# Patient Record
Sex: MALE | Race: ASIAN | NOT HISPANIC OR LATINO | Employment: OTHER | URBAN - METROPOLITAN AREA
[De-identification: names, ages, dates, MRNs, and addresses within clinical notes are randomized per-mention and may not be internally consistent; named-entity substitution may affect disease eponyms.]

---

## 2017-05-16 ENCOUNTER — OFFICE VISIT (OUTPATIENT)
Dept: URGENT CARE | Facility: CLINIC | Age: 77
End: 2017-05-16
Payer: MEDICARE

## 2017-05-16 VITALS
SYSTOLIC BLOOD PRESSURE: 140 MMHG | HEART RATE: 64 BPM | TEMPERATURE: 98 F | DIASTOLIC BLOOD PRESSURE: 100 MMHG | HEIGHT: 64 IN | RESPIRATION RATE: 16 BRPM | BODY MASS INDEX: 29.02 KG/M2 | WEIGHT: 170 LBS | OXYGEN SATURATION: 94 %

## 2017-05-16 DIAGNOSIS — K21.9 GASTROESOPHAGEAL REFLUX DISEASE WITHOUT ESOPHAGITIS: ICD-10-CM

## 2017-05-16 PROCEDURE — 1101F PT FALLS ASSESS-DOCD LE1/YR: CPT | Mod: 8P | Performed by: PHYSICIAN ASSISTANT

## 2017-05-16 PROCEDURE — 4004F PT TOBACCO SCREEN RCVD TLK: CPT | Mod: 8P | Performed by: PHYSICIAN ASSISTANT

## 2017-05-16 PROCEDURE — 99204 OFFICE O/P NEW MOD 45 MIN: CPT | Performed by: PHYSICIAN ASSISTANT

## 2017-05-16 PROCEDURE — G8419 CALC BMI OUT NRM PARAM NOF/U: HCPCS | Performed by: PHYSICIAN ASSISTANT

## 2017-05-16 PROCEDURE — G8432 DEP SCR NOT DOC, RNG: HCPCS | Performed by: PHYSICIAN ASSISTANT

## 2017-05-16 PROCEDURE — 4040F PNEUMOC VAC/ADMIN/RCVD: CPT | Mod: 8P | Performed by: PHYSICIAN ASSISTANT

## 2017-05-16 RX ORDER — PANTOPRAZOLE SODIUM 40 MG/1
40 TABLET, DELAYED RELEASE ORAL DAILY
Qty: 30 TAB | Refills: 0 | Status: SHIPPED | OUTPATIENT
Start: 2017-05-16

## 2017-05-16 RX ORDER — VALSARTAN 80 MG/1
80 TABLET ORAL DAILY
COMMUNITY

## 2017-05-16 RX ORDER — ATORVASTATIN CALCIUM 10 MG/1
20 TABLET, FILM COATED ORAL NIGHTLY
COMMUNITY

## 2017-05-16 RX ORDER — RANITIDINE 150 MG/1
150 TABLET ORAL 2 TIMES DAILY
Qty: 60 TAB | Refills: 0 | Status: SHIPPED | OUTPATIENT
Start: 2017-05-16

## 2017-05-16 RX ORDER — DOXAZOSIN MESYLATE 1 MG/1
1 TABLET ORAL DAILY
COMMUNITY

## 2017-05-16 ASSESSMENT — ENCOUNTER SYMPTOMS
CHILLS: 0
CHANGE IN BOWEL HABIT: 0
PALPITATIONS: 0
FOCAL WEAKNESS: 0
SHORTNESS OF BREATH: 0
FEVER: 0
TINGLING: 0
SENSORY CHANGE: 0
NAUSEA: 0
HEARTBURN: 1
VOMITING: 0
FATIGUE: 0
DIZZINESS: 0
ABDOMINAL PAIN: 1
MYALGIAS: 0
COUGH: 0
DIARRHEA: 0
CONSTIPATION: 0
BLOOD IN STOOL: 0

## 2017-05-16 NOTE — MR AVS SNAPSHOT
"        Red Dukesn   2017 5:15 PM   Office Visit   MRN: [id number]    Department:  [facility]   Dept Phone:  [phone]    Description:  Male : 1940   Provider:  Vanessa Craft PA-C           Reason for Visit     Heartburn on and off x1wk      Allergies as of 2017     Allergen Noted Reactions    Asa [Aspirin] 2017   Unspecified    Gi problem      You were diagnosed with     Gastroesophageal reflux disease without esophagitis   [803257]         Vital Signs     Blood Pressure Pulse Temperature Respirations Height Weight    140/100 mmHg 64 36.7 °C (98 °F) 16 1.613 m (5' 3.5\") 77.111 kg (170 lb)    Body Mass Index Oxygen Saturation                29.64 kg/m2 94%          Basic Information     Date Of Birth Sex Race Ethnicity Preferred Language    1940 Male  Non- English      Health Maintenance     Patient has no pending health maintenance at this time      Current Immunizations     No immunizations on file.      Below and/or attached are the medications your provider expects you to take. Review all of your home medications and newly ordered medications with your provider and/or pharmacist. Follow medication instructions as directed by your provider and/or pharmacist. Please keep your medication list with you and share with your provider. Update the information when medications are discontinued, doses are changed, or new medications (including over-the-counter products) are added; and carry medication information at all times in the event of emergency situations     Allergies:  ASA - Unspecified               Medications  Valid as of: May 16, 2017 -  6:21 PM    Generic Name Brand Name Tablet Size Instructions for use    Atorvastatin Calcium (Tab) LIPITOR 10 MG Take 20 mg by mouth every evening.        Clopidogrel Bisulfate   Take  by mouth.        Doxazosin Mesylate (Tab) CARDURA 1 MG Take 1 mg by mouth every day.        Dutasteride   Take  by mouth.       " Famotidine   Take  by mouth.        Pantoprazole Sodium (Tablet Delayed Response) PROTONIX 40 MG Take 1 Tab by mouth every day.        RaNITidine HCl (Tab) ZANTAC 150 MG Take 1 Tab by mouth 2 times a day.        Valsartan (Tab) DIOVAN 80 MG Take 80 mg by mouth every day.        .                 Medicines prescribed today were sent to:     [facility]ARMACY #[id number] - [place], [place] - [address]    [address] [place] [address]    Phone: [phone] Fax: [phone]    Open 24 Hours?: No      Medication refill instructions:       If your prescription bottle indicates you have medication refills left, it is not necessary to call your provider’s office. Please contact your pharmacy and they will refill your medication.    If your prescription bottle indicates you do not have any refills left, you may request refills at any time through one of the following ways: The online [employer] system (except Urgent Care), by calling your provider’s office, or by asking your pharmacy to contact your provider’s office with a refill request. Medication refills are processed only during regular business hours and may not be available until the next business day. Your provider may request additional information or to have a follow-up visit with you prior to refilling your medication.   *Please Note: Medication refills are assigned a new Rx number when refilled electronically. Your pharmacy may indicate that no refills were authorized even though a new prescription for the same medication is available at the pharmacy. Please request the medicine by name with the pharmacy before contacting your provider for a refill.        Instructions    Gastroesophageal Reflux Disease, Adult  Gastroesophageal reflux disease (GERD) happens when acid from your stomach flows up into the esophagus. When acid comes in contact with the esophagus, the acid causes soreness (inflammation) in the esophagus. Over time, GERD may create  small holes (ulcers) in the lining of the esophagus.  CAUSES   · Increased body weight. This puts pressure on the stomach, making acid rise from the stomach into the esophagus.  · Smoking. This increases acid production in the stomach.  · Drinking alcohol. This causes decreased pressure in the lower esophageal sphincter (valve or ring of muscle between the esophagus and stomach), allowing acid from the stomach into the esophagus.  · Late evening meals and a full stomach. This increases pressure and acid production in the stomach.  · A malformed lower esophageal sphincter.  Sometimes, no cause is found.  SYMPTOMS   · Burning pain in the lower part of the mid-chest behind the breastbone and in the mid-stomach area. This may occur twice a week or more often.  · Trouble swallowing.  · Sore throat.  · Dry cough.  · Asthma-like symptoms including chest tightness, shortness of breath, or wheezing.  DIAGNOSIS   Your caregiver may be able to diagnose GERD based on your symptoms. In some cases, X-rays and other tests may be done to check for complications or to check the condition of your stomach and esophagus.  TREATMENT   Your caregiver may recommend over-the-counter or prescription medicines to help decrease acid production. Ask your caregiver before starting or adding any new medicines.   HOME CARE INSTRUCTIONS   · Change the factors that you can control. Ask your caregiver for guidance concerning weight loss, quitting smoking, and alcohol consumption.  · Avoid foods and drinks that make your symptoms worse, such as:  ¨ Caffeine or alcoholic drinks.  ¨ Chocolate.  ¨ Peppermint or mint flavorings.  ¨ Garlic and onions.  ¨ Spicy foods.  ¨ Citrus fruits, such as oranges, jeff, or limes.  ¨ Tomato-based foods such as sauce, chili, salsa, and pizza.  ¨ Fried and fatty foods.  · Avoid lying down for the 3 hours prior to your bedtime or prior to taking a nap.  · Eat small, frequent meals instead of large meals.  · Wear  loose-fitting clothing. Do not wear anything tight around your waist that causes pressure on your stomach.  · Raise the head of your bed 6 to 8 inches with wood blocks to help you sleep. Extra pillows will not help.  · Only take over-the-counter or prescription medicines for pain, discomfort, or fever as directed by your caregiver.  · Do not take aspirin, ibuprofen, or other nonsteroidal anti-inflammatory drugs (NSAIDs).  SEEK IMMEDIATE MEDICAL CARE IF:   · You have pain in your arms, neck, jaw, teeth, or back.  · Your pain increases or changes in intensity or duration.  · You develop nausea, vomiting, or sweating (diaphoresis).  · You develop shortness of breath, or you faint.  · Your vomit is green, yellow, black, or looks like coffee grounds or blood.  · Your stool is red, bloody, or black.  These symptoms could be signs of other problems, such as heart disease, gastric bleeding, or esophageal bleeding.  MAKE SURE YOU:   · Understand these instructions.  · Will watch your condition.  · Will get help right away if you are not doing well or get worse.     This information is not intended to replace advice given to you by your health care provider. Make sure you discuss any questions you have with your health care provider.     Document Released: 09/27/2006 Document Revised: 01/08/2016 Document Reviewed: 04/13/2016  [employer] Interactive Patient Education ©2016 Elsevier Inc.            VisiQuate Access Code: [email or web address]  Expires: 6/15/2017  6:21 PM    Your email address is not on file at [facility].  Email Addresses are required for you to sign up for VisiQuate, please contact [phone] to verify your personal information and to provide your email address prior to attempting to register for VisiQuate.    Red Montalvo  [address] [place] [address]    VisiQuate  A secure, online tool to manage your health information     [facility]’s VisiQuate® is a secure, online tool that connects you to  your personalized health information from the privacy of your home -- day or night - making it very easy for you to manage your healthcare. Once the activation process is completed, you can even access your medical information using the [email or web address] merry, which is available for free in the Apple Merry store or Google Play store.     To learn more about [email or web address]), visit [email or web address]    There are two levels of access available (as shown below):   My Chart Features  [facility]  Specialists [facility]  Urgent  Care Non-[facility] Primary Care Doctor   Email your healthcare team securely and privately 24/7 X X X    Manage appointments: schedule your next appointment; view details of past/upcoming appointments X      Request prescription refills. X      View recent personal medical records, including lab and immunizations X X X X   View health record, including health history, allergies, medications X X X X   Read reports about your outpatient visits, procedures, consult and ER notes X X X X   See your discharge summary, which is a recap of your hospital and/or ER visit that includes your diagnosis, lab results, and care plan X X  X     How to register for [email or web address]):  Once your e-mail address has been verified, follow the following steps to sign up for [email or web address]).     1. Go to  [email or web address]  2. Click on the Sign Up Now box, which takes you to the New Member Sign Up page. You will need to provide the following information:  a. Enter your [email or web address] ([employer]) Access Code exactly as it appears at the top of this page. (You will not need to use this code after you’ve completed the sign-up process. If you do not sign up before the expiration date, you must request a new code.)   b. Enter your date of birth.   c. Enter your home email address.   d. Click Submit, and follow the next screen’s instructions.  3. Create a [employer]) ID. This will be your [email or web address]) login ID and cannot be changed, so think of one that is secure  and easy to remember.  4. Create a [employer] password. You can change your password at any time.  5. Enter your Password Reset Question and Answer. This can be used at a later time if you forget your password.   6. Enter your e-mail address. This allows you to receive e-mail notifications when new information is available in valuklik.  7. Click Sign Up. You can now view your health information.    For assistance activating your [employer] account, call [phone]

## 2017-05-17 NOTE — PROGRESS NOTES
Subjective:      Red Montalvo is a 77 y.o. male who presents with Heartburn            Heartburn  This is a recurrent problem. Episode onset: new onset agian 1 week ago  The problem occurs intermittently. The problem has been waxing and waning (the patient denies any symptoms at this time. His symptoms are worse after eating. ). Associated symptoms include abdominal pain (epigastric burning that radiates up chest, burping ). Pertinent negatives include no change in bowel habit, chest pain, chills, coughing, fatigue, fever, myalgias, nausea, rash or vomiting. Exacerbated by: eating, lying flat at night. Treatments tried: The patient tried pepcid and tums without relief. He has tried Protonix in the past and experienced significant relief. He would like to try Protonix again.   The patient has had heartburn for many years. He states his symptoms feel exactly the same. He denies any chest pain or shortness of breath. He is asymptomatic at this time.     No past medical history on file.    No past surgical history on file.    No family history on file.    Allergies   Allergen Reactions   • Asa [Aspirin] Unspecified     Gi problem       Medications, Allergies, and current problem list reviewed today in Epic      Review of Systems   Constitutional: Negative for fever, chills and fatigue.   Respiratory: Negative for cough and shortness of breath.    Cardiovascular: Negative for chest pain, palpitations and leg swelling.   Gastrointestinal: Positive for heartburn and abdominal pain (epigastric burning that radiates up chest, burping ). Negative for nausea, vomiting, diarrhea, constipation, blood in stool, melena and change in bowel habit.   Musculoskeletal: Negative for myalgias.   Skin: Negative for rash.   Neurological: Negative for dizziness, tingling, sensory change and focal weakness.     All other systems reviewed and are negative.        Objective:     /100 mmHg  Pulse 64  Temp(Src) 36.7 °C (98 °F)  Resp  "16  Ht 1.613 m (5' 3.5\")  Wt 77.111 kg (170 lb)  BMI 29.64 kg/m2  SpO2 94%     Physical Exam   Constitutional: He is oriented to person, place, and time. He appears well-developed and well-nourished. No distress.   Eyes: Conjunctivae are normal.   Neck: Neck supple.   Cardiovascular: Normal rate, regular rhythm and normal heart sounds.  Exam reveals no gallop and no friction rub.    No murmur heard.  Pulmonary/Chest: Effort normal and breath sounds normal. No respiratory distress. He has no wheezes. He has no rales.   Abdominal: Soft. Normal appearance and bowel sounds are normal. He exhibits no distension and no mass. There is no hepatosplenomegaly. There is no tenderness. There is no rigidity, no rebound, no guarding, no CVA tenderness, no tenderness at McBurney's point and negative Hernándze's sign.   Lymphadenopathy:     He has no cervical adenopathy.   Neurological: He is alert and oriented to person, place, and time. No cranial nerve deficit.   Psychiatric: He has a normal mood and affect. His behavior is normal. Judgment and thought content normal.               Assessment/Plan:     1. Gastroesophageal reflux disease without esophagitis    - pantoprazole (PROTONIX) 40 MG Tablet Delayed Response; Take 1 Tab by mouth every day.  Dispense: 30 Tab; Refill: 0  - ranitidine (ZANTAC) 150 MG Tab; Take 1 Tab by mouth 2 times a day.  Dispense: 60 Tab; Refill: 0  - diet modification discussed.  - advised follow-up with PCP in 4 weeks if symptoms do not improve  - red flags and warning signs discussed that warrant immediate follow-up.    Patient  given precautionary s/sx that mandate immediate follow up and evaluation in the ED. Advised of risks of not doing so.    Printed instructions regarding GERD given to the patient.     Differential diagnoses, Supportive care, and indications for immediate follow-up discussed with patient.   Instructed to return to clinic or nearest emergency department for any change in " condition, further concerns, or worsening of symptoms.    The patient demonstrated a good understanding and agreed with the treatment plan.    Vanessa Craft PA-C

## 2017-05-17 NOTE — PATIENT INSTRUCTIONS
Gastroesophageal Reflux Disease, Adult  Gastroesophageal reflux disease (GERD) happens when acid from your stomach flows up into the esophagus. When acid comes in contact with the esophagus, the acid causes soreness (inflammation) in the esophagus. Over time, GERD may create small holes (ulcers) in the lining of the esophagus.  CAUSES   · Increased body weight. This puts pressure on the stomach, making acid rise from the stomach into the esophagus.  · Smoking. This increases acid production in the stomach.  · Drinking alcohol. This causes decreased pressure in the lower esophageal sphincter (valve or ring of muscle between the esophagus and stomach), allowing acid from the stomach into the esophagus.  · Late evening meals and a full stomach. This increases pressure and acid production in the stomach.  · A malformed lower esophageal sphincter.  Sometimes, no cause is found.  SYMPTOMS   · Burning pain in the lower part of the mid-chest behind the breastbone and in the mid-stomach area. This may occur twice a week or more often.  · Trouble swallowing.  · Sore throat.  · Dry cough.  · Asthma-like symptoms including chest tightness, shortness of breath, or wheezing.  DIAGNOSIS   Your caregiver may be able to diagnose GERD based on your symptoms. In some cases, X-rays and other tests may be done to check for complications or to check the condition of your stomach and esophagus.  TREATMENT   Your caregiver may recommend over-the-counter or prescription medicines to help decrease acid production. Ask your caregiver before starting or adding any new medicines.   HOME CARE INSTRUCTIONS   · Change the factors that you can control. Ask your caregiver for guidance concerning weight loss, quitting smoking, and alcohol consumption.  · Avoid foods and drinks that make your symptoms worse, such as:  ¨ Caffeine or alcoholic drinks.  ¨ Chocolate.  ¨ Peppermint or mint flavorings.  ¨ Garlic and onions.  ¨ Spicy foods.  ¨ Citrus fruits,  such as oranges, jeff, or limes.  ¨ Tomato-based foods such as sauce, chili, salsa, and pizza.  ¨ Fried and fatty foods.  · Avoid lying down for the 3 hours prior to your bedtime or prior to taking a nap.  · Eat small, frequent meals instead of large meals.  · Wear loose-fitting clothing. Do not wear anything tight around your waist that causes pressure on your stomach.  · Raise the head of your bed 6 to 8 inches with wood blocks to help you sleep. Extra pillows will not help.  · Only take over-the-counter or prescription medicines for pain, discomfort, or fever as directed by your caregiver.  · Do not take aspirin, ibuprofen, or other nonsteroidal anti-inflammatory drugs (NSAIDs).  SEEK IMMEDIATE MEDICAL CARE IF:   · You have pain in your arms, neck, jaw, teeth, or back.  · Your pain increases or changes in intensity or duration.  · You develop nausea, vomiting, or sweating (diaphoresis).  · You develop shortness of breath, or you faint.  · Your vomit is green, yellow, black, or looks like coffee grounds or blood.  · Your stool is red, bloody, or black.  These symptoms could be signs of other problems, such as heart disease, gastric bleeding, or esophageal bleeding.  MAKE SURE YOU:   · Understand these instructions.  · Will watch your condition.  · Will get help right away if you are not doing well or get worse.     This information is not intended to replace advice given to you by your health care provider. Make sure you discuss any questions you have with your health care provider.     Document Released: 09/27/2006 Document Revised: 01/08/2016 Document Reviewed: 04/13/2016  [employer] Interactive Patient Education ©2016 [employer].

## 2017-07-06 ENCOUNTER — OFFICE VISIT (OUTPATIENT)
Dept: URGENT CARE | Facility: CLINIC | Age: 77
End: 2017-07-06
Payer: MEDICARE

## 2017-07-06 VITALS
BODY MASS INDEX: 30.12 KG/M2 | HEIGHT: 63 IN | HEART RATE: 66 BPM | SYSTOLIC BLOOD PRESSURE: 142 MMHG | WEIGHT: 170 LBS | RESPIRATION RATE: 16 BRPM | TEMPERATURE: 97.7 F | DIASTOLIC BLOOD PRESSURE: 82 MMHG | OXYGEN SATURATION: 99 %

## 2017-07-06 DIAGNOSIS — N40.1 BENIGN PROSTATIC HYPERPLASIA WITH LOWER URINARY TRACT SYMPTOMS, UNSPECIFIED MORPHOLOGY: ICD-10-CM

## 2017-07-06 DIAGNOSIS — Z87.448 HISTORY OF HEMATURIA: ICD-10-CM

## 2017-07-06 LAB
APPEARANCE UR: CLEAR
BILIRUB UR STRIP-MCNC: NORMAL MG/DL
COLOR UR AUTO: YELLOW
GLUCOSE UR STRIP.AUTO-MCNC: NORMAL MG/DL
KETONES UR STRIP.AUTO-MCNC: NORMAL MG/DL
LEUKOCYTE ESTERASE UR QL STRIP.AUTO: NORMAL
NITRITE UR QL STRIP.AUTO: NORMAL
PH UR STRIP.AUTO: 7 [PH] (ref 5–8)
PROT UR QL STRIP: NORMAL MG/DL
RBC UR QL AUTO: NORMAL
SP GR UR STRIP.AUTO: 1
UROBILINOGEN UR STRIP-MCNC: NORMAL MG/DL

## 2017-07-06 PROCEDURE — 81002 URINALYSIS NONAUTO W/O SCOPE: CPT | Performed by: PHYSICIAN ASSISTANT

## 2017-07-06 PROCEDURE — 99214 OFFICE O/P EST MOD 30 MIN: CPT | Performed by: PHYSICIAN ASSISTANT

## 2017-07-06 ASSESSMENT — ENCOUNTER SYMPTOMS
FEVER: 0
NAUSEA: 0
HEADACHES: 0
EYE REDNESS: 0
SORE THROAT: 0
FATIGUE: 0
CHANGE IN BOWEL HABIT: 0
ROS GI COMMENTS: SUPRAPUBIC PRESSURE
CHILLS: 0
WHEEZING: 0
NECK PAIN: 0
FLANK PAIN: 0
COUGH: 0
BACK PAIN: 0
MYALGIAS: 0
VOMITING: 0
EYE DISCHARGE: 0
EMPTYING BLADDER: 1

## 2017-07-06 NOTE — PROGRESS NOTES
"Subjective:      Red Montalvo is a 77 y.o. male who presents with Cystitis          Pt is 78 y/o male who presents with worsening urinary retention off/on for the last few weeks. Pt. Reports hx of BPH along with intermittent hematuria. He is followed by urology in [place], and reports negative cystoscopy, neg. Prostate Biopsy, and negative UA in the last 2-3 months. He also had negative renal and bladder scans. He reports that he was encouraged by his PCP to increase is dose of Cardura, however he was worried that it might make him light-headed. He reports however that his symptoms of decreased urinary flow, with dribbling has increased. He reports abd. Pressure to \"go\" sometimes, but denies any abd. Pain, back pain. He denies any bowel changes. Of note he reports intermittent dysuria sometimes if he has to strain to urinate.     Cystitis   Associated symptoms include frequency, hematuria and urgency. Pertinent negatives include no chills, flank pain, nausea or vomiting.   Urinary Retention  This is a chronic problem. Episode onset: worse the last few days.  The problem occurs intermittently. The problem has been waxing and waning. Associated symptoms include urinary symptoms. Pertinent negatives include no change in bowel habit, chest pain, chills, congestion, coughing, fatigue, fever, headaches, myalgias, nausea, neck pain, rash, sore throat or vomiting. Associated symptoms comments: Reports abd. Pressure. . Nothing aggravates the symptoms. Treatments tried: Flomax, avodart, and Cardura. The treatment provided significant relief.       Review of Systems   Constitutional: Negative for fever, chills, malaise/fatigue and fatigue.   HENT: Negative for congestion and sore throat.    Eyes: Negative for discharge and redness.   Respiratory: Negative for cough and wheezing.    Cardiovascular: Negative for chest pain and leg swelling.   Gastrointestinal: Negative for nausea, vomiting and change in bowel habit.        " "Suprapubic pressure   Genitourinary: Positive for dysuria, urgency, frequency and hematuria. Negative for flank pain.   Musculoskeletal: Negative for myalgias, back pain and neck pain.   Skin: Negative for itching and rash.   Neurological: Negative for headaches.          Objective:     /82 mmHg  Pulse 66  Temp(Src) 36.5 °C (97.7 °F)  Resp 16  Ht 1.6 m (5' 2.99\")  Wt 77.111 kg (170 lb)  BMI 30.12 kg/m2  SpO2 99%   PMH:  has a past medical history of Hypertension; Hyperlipidemia; and GERD (gastroesophageal reflux disease).  MEDS:   Current outpatient prescriptions:   •  valsartan (DIOVAN) 80 MG Tab, Take 80 mg by mouth every day., Disp: , Rfl:   •  Clopidogrel Bisulfate (PLAVIX PO), Take  by mouth., Disp: , Rfl:   •  Dutasteride (AVODART PO), Take  by mouth., Disp: , Rfl:   •  doxazosin (CARDURA) 1 MG Tab, Take 1 mg by mouth every day., Disp: , Rfl:   •  atorvastatin (LIPITOR) 10 MG Tab, Take 20 mg by mouth every evening., Disp: , Rfl:   •  Famotidine (PEPCID PO), Take  by mouth., Disp: , Rfl:   •  pantoprazole (PROTONIX) 40 MG Tablet Delayed Response, Take 1 Tab by mouth every day., Disp: 30 Tab, Rfl: 0  •  ranitidine (ZANTAC) 150 MG Tab, Take 1 Tab by mouth 2 times a day., Disp: 60 Tab, Rfl: 0  ALLERGIES:   Allergies   Allergen Reactions   • Asa [Aspirin] Unspecified     Gi problem     SURGHX: History reviewed. No pertinent past surgical history.  SOCHX:  reports that he has quit smoking. He does not have any smokeless tobacco history on file.  FH: Family history was reviewed, no pertinent findings to report    Physical Exam   Constitutional: He is oriented to person, place, and time. He appears well-developed.   HENT:   Head: Normocephalic and atraumatic.   Eyes: EOM are normal. Pupils are equal, round, and reactive to light.   Neck: Normal range of motion. Neck supple.   Cardiovascular: Normal rate and regular rhythm.    No murmur heard.  Pulmonary/Chest: Effort normal and breath sounds normal. No " "respiratory distress.   Abdominal: Soft. Bowel sounds are normal. He exhibits no distension and no mass. There is no tenderness. There is no rebound and no guarding.    Negative CVAT.    Musculoskeletal: Normal range of motion. He exhibits no edema.   Neurological: He is alert and oriented to person, place, and time.   Skin: Skin is warm and dry.   Psychiatric: He has a normal mood and affect. His behavior is normal.   Vitals reviewed.            UA- Trace blood in urine- otherwise WNL.   Assessment/Plan:     1. Benign prostatic hyperplasia with lower urinary tract symptoms, unspecified morphology  2. History of hematuria      At this time I encouraged the patient to increase his dose of Cardura to 2mg and to monitor for symptoms along with BP. He declines any further evaluation as he recently had negative work up with urology. He also declines a referral here in [place] as he goes back and forth to [place] every few weeks and will make an nasim. ASAP as he reports that his Urologist is easy to get into.   Pt. Also declines a RX as he has \"plenty at home\". Due to recent negative work up, without fevers, clean UA- along with benign abd. Exam- I do think it's appropriate for his to make appt. With Urology ASAP- however not emergent. Also encouraged patient to have UA rechecked to make sure hematuria is benign.   Patient given precautionary s/sx that mandate immediate follow up and evaluation in the ED. Advised of risks of not doing so.    DDX, Supportive care, and indications for immediate follow-up discussed with patient.    Instructed to return to clinic or nearest emergency department if we are not available for any change in condition, further concerns, or worsening of symptoms.    The patient demonstrated a good understanding and agreed with the treatment plan.        "

## 2017-07-06 NOTE — MR AVS SNAPSHOT
"        Red Dukesn   2017 9:15 AM   Office Visit   MRN: [id number]    Department:  [facility]   Dept Phone:  [phone]    Description:  Male : 1940   Provider:  Kirit Jones PA-C           Reason for Visit     Cystitis intermittent dysuria and \"not enough pressure\" x 1 month      Allergies as of 2017     Allergen Noted Reactions    Asa [Aspirin] 2017   Unspecified    Gi problem      You were diagnosed with     Benign prostatic hyperplasia with lower urinary tract symptoms, unspecified morphology   [[id number]]       History of hematuria   [[id number]]         Vital Signs     Blood Pressure Pulse Temperature Respirations Height Weight    142/82 mmHg 66 36.5 °C (97.7 °F) 16 1.6 m (5' 2.99\") 77.111 kg (170 lb)    Body Mass Index Oxygen Saturation Smoking Status             30.12 kg/m2 99% Former Smoker         Basic Information     Date Of Birth Sex Race Ethnicity Preferred Language    1940 Male  Non- English      Health Maintenance        Date Due Completion Dates    IMM DTaP/Tdap/Td Vaccine (1 - Tdap) 3/1/1958 ---    COLONOSCOPY 3/1/1989 ---    IMM ZOSTER VACCINE 3/1/1999 ---    IMM PNEUMOCOCCAL 65+ (ADULT) LOW/MEDIUM RISK SERIES (1 of 2 - PCV13) 3/1/2004 ---    IMM INFLUENZA (1) 2017 ---            Results     POCT Urinalysis      Component Value Standard Range & Units    POC Color yellow Negative    POC Appearance clear Negative    POC Leukocyte Esterase n Negative    POC Nitrites n Negative    POC Urobiligen n Negative (0.2) mg/dL    POC Protein n Negative mg/dL    POC Urine PH 7.0 5.0 - 8.0    POC Blood tr Negative    POC Specific Gravity 1.000 <1.005 - >1.030    POC Ketones n Negative mg/dL    POC Biliruben n Negative mg/dL    POC Glucose n Negative mg/dL                        Current Immunizations     No immunizations on file.      Below and/or attached are the medications your provider expects you to take. Review all of your home medications and newly " ordered medications with your provider and/or pharmacist. Follow medication instructions as directed by your provider and/or pharmacist. Please keep your medication list with you and share with your provider. Update the information when medications are discontinued, doses are changed, or new medications (including over-the-counter products) are added; and carry medication information at all times in the event of emergency situations     Allergies:  ASA - Unspecified               Medications  Valid as of: July 06, 2017 - 10:32 AM    Generic Name Brand Name Tablet Size Instructions for use    Atorvastatin Calcium (Tab) LIPITOR 10 MG Take 20 mg by mouth every evening.        Clopidogrel Bisulfate   Take  by mouth.        Doxazosin Mesylate (Tab) CARDURA 1 MG Take 1 mg by mouth every day.        Dutasteride   Take  by mouth.        Famotidine   Take  by mouth.        Pantoprazole Sodium (Tablet Delayed Response) PROTONIX 40 MG Take 1 Tab by mouth every day.        RaNITidine HCl (Tab) ZANTAC 150 MG Take 1 Tab by mouth 2 times a day.        Valsartan (Tab) DIOVAN 80 MG Take 80 mg by mouth every day.        .                 Medicines prescribed today were sent to:     [facility][id number] - [place], [place] - [address] [place] [address] AT [address]    [address] [place] [id number]    Phone: [phone] Fax: [phone]    Open 24 Hours?: No      Medication refill instructions:       If your prescription bottle indicates you have medication refills left, it is not necessary to call your provider’s office. Please contact your pharmacy and they will refill your medication.    If your prescription bottle indicates you do not have any refills left, you may request refills at any time through one of the following ways: The online [employer] system (except Urgent Care), by calling your provider’s office, or by asking your pharmacy to contact your provider’s office with a refill request. Medication refills are processed only during  regular business hours and may not be available until the next business day. Your provider may request additional information or to have a follow-up visit with you prior to refilling your medication.   *Please Note: Medication refills are assigned a new Rx number when refilled electronically. Your pharmacy may indicate that no refills were authorized even though a new prescription for the same medication is available at the pharmacy. Please request the medicine by name with the pharmacy before contacting your provider for a refill.           [facility] Access Code: Activation code not generated  Current [facility] Status: Active

## 2018-01-10 ENCOUNTER — OFFICE VISIT (OUTPATIENT)
Dept: URGENT CARE | Facility: CLINIC | Age: 78
End: 2018-01-10
Payer: MEDICARE

## 2018-01-10 VITALS
RESPIRATION RATE: 16 BRPM | SYSTOLIC BLOOD PRESSURE: 122 MMHG | WEIGHT: 167 LBS | HEART RATE: 64 BPM | HEIGHT: 63 IN | BODY MASS INDEX: 29.59 KG/M2 | DIASTOLIC BLOOD PRESSURE: 72 MMHG | OXYGEN SATURATION: 94 % | TEMPERATURE: 97 F

## 2018-01-10 DIAGNOSIS — J40 BRONCHITIS: Primary | ICD-10-CM

## 2018-01-10 DIAGNOSIS — J06.9 URI WITH COUGH AND CONGESTION: ICD-10-CM

## 2018-01-10 PROCEDURE — 99214 OFFICE O/P EST MOD 30 MIN: CPT | Performed by: PHYSICIAN ASSISTANT

## 2018-01-10 RX ORDER — PROMETHAZINE HYDROCHLORIDE AND CODEINE PHOSPHATE 6.25; 1 MG/5ML; MG/5ML
5 SYRUP ORAL 4 TIMES DAILY PRN
Qty: 240 ML | Refills: 0 | Status: SHIPPED | OUTPATIENT
Start: 2018-01-10 | End: 2018-01-24

## 2018-01-10 RX ORDER — METHYLPREDNISOLONE 4 MG/1
TABLET ORAL
Qty: 21 TAB | Refills: 0 | Status: SHIPPED | OUTPATIENT
Start: 2018-01-10

## 2018-01-10 RX ORDER — DOXYCYCLINE HYCLATE 100 MG
100 TABLET ORAL 2 TIMES DAILY
Qty: 14 TAB | Refills: 0 | Status: SHIPPED | OUTPATIENT
Start: 2018-01-10 | End: 2018-01-17

## 2018-01-10 NOTE — PATIENT INSTRUCTIONS
Acute Bronchitis  Bronchitis is inflammation of the airways that extend from the windpipe into the lungs (bronchi). The inflammation often causes mucus to develop. This leads to a cough, which is the most common symptom of bronchitis.   In acute bronchitis, the condition usually develops suddenly and goes away over time, usually in a couple weeks. Smoking, allergies, and asthma can make bronchitis worse. Repeated episodes of bronchitis may cause further lung problems.   CAUSES  Acute bronchitis is most often caused by the same virus that causes a cold. The virus can spread from person to person (contagious) through coughing, sneezing, and touching contaminated objects.  SIGNS AND SYMPTOMS   · Cough.    · Fever.    · Coughing up mucus.    · Body aches.    · Chest congestion.    · Chills.    · Shortness of breath.    · Sore throat.    DIAGNOSIS   Acute bronchitis is usually diagnosed through a physical exam. Your health care provider will also ask you questions about your medical history. Tests, such as chest X-rays, are sometimes done to rule out other conditions.   TREATMENT   Acute bronchitis usually goes away in a couple weeks. Oftentimes, no medical treatment is necessary. Medicines are sometimes given for relief of fever or cough. Antibiotic medicines are usually not needed but may be prescribed in certain situations. In some cases, an inhaler may be recommended to help reduce shortness of breath and control the cough. A cool mist vaporizer may also be used to help thin bronchial secretions and make it easier to clear the chest.   HOME CARE INSTRUCTIONS  · Get plenty of rest.    · Drink enough fluids to keep your urine clear or pale yellow (unless you have a medical condition that requires fluid restriction). Increasing fluids may help thin your respiratory secretions (sputum) and reduce chest congestion, and it will prevent dehydration.    · Take medicines only as directed by your health care provider.  · If  you were prescribed an antibiotic medicine, finish it all even if you start to feel better.  · Avoid smoking and secondhand smoke. Exposure to cigarette smoke or irritating chemicals will make bronchitis worse. If you are a smoker, consider using nicotine gum or skin patches to help control withdrawal symptoms. Quitting smoking will help your lungs heal faster.    · Reduce the chances of another bout of acute bronchitis by washing your hands frequently, avoiding people with cold symptoms, and trying not to touch your hands to your mouth, nose, or eyes.    · Keep all follow-up visits as directed by your health care provider.    SEEK MEDICAL CARE IF:  Your symptoms do not improve after 1 week of treatment.   SEEK IMMEDIATE MEDICAL CARE IF:  · You develop an increased fever or chills.    · You have chest pain.    · You have severe shortness of breath.  · You have bloody sputum.    · You develop dehydration.  · You faint or repeatedly feel like you are going to pass out.  · You develop repeated vomiting.  · You develop a severe headache.  MAKE SURE YOU:   · Understand these instructions.  · Will watch your condition.  · Will get help right away if you are not doing well or get worse.     This information is not intended to replace advice given to you by your health care provider. Make sure you discuss any questions you have with your health care provider.     Document Released: 01/25/2006 Document Revised: 01/08/2016 Document Reviewed: 06/10/2014  [employer] Interactive Patient Education ©2016 [employer]

## 2018-01-11 NOTE — PROGRESS NOTES
Subjective:      Pt is a 77 y.o. male who presents with Cough (x1 week dry, sometimes productive)            HPI  PT presents to  clinic today complaining of sore throat,  pressure in ears, cough, fatigue, runny nose, wheezing and SOB. PT denies CP, NVD, abdominal pain, joint pain. PT states these symptoms began around 7 days ago. PT states the pain is a 6/10 with coughing, aching in nature and worse at night.  Pt has not taken any RX medications for this condition. The pt's medication list, problem list, and allergies have been evaluated and reviewed during today's visit.    PMH:  Past Medical History:   Diagnosis Date   • GERD (gastroesophageal reflux disease)    • Hyperlipidemia    • Hypertension        PSH:  Negative per pt.      Fam Hx:  the patient's family history is not pertinent to their current complaint    Soc HX:  Social History     Social History   • Marital status: Single     Spouse name: N/A   • Number of children: N/A   • Years of education: N/A     Occupational History   • Not on file.     Social History Main Topics   • Smoking status: Former Smoker   • Smokeless tobacco: Never Used   • Alcohol use Not on file   • Drug use: Unknown   • Sexual activity: Not on file     Other Topics Concern   • Not on file     Social History Narrative   • No narrative on file         Medications:    Current Outpatient Prescriptions:   •  doxycycline (VIBRAMYCIN) 100 MG Tab, Take 1 Tab by mouth 2 times a day for 7 days., Disp: 14 Tab, Rfl: 0  •  promethazine-codeine (PHENERGAN-CODEINE) 6.25-10 MG/5ML Syrup, Take 5 mL by mouth 4 times a day as needed for up to 14 days., Disp: 240 mL, Rfl: 0  •  MethylPREDNISolone (MEDROL DOSEPAK) 4 MG Tablet Therapy Pack, Use as directed, Disp: 21 Tab, Rfl: 0  •  valsartan (DIOVAN) 80 MG Tab, Take 80 mg by mouth every day., Disp: , Rfl:   •  Clopidogrel Bisulfate (PLAVIX PO), Take  by mouth., Disp: , Rfl:   •  Dutasteride (AVODART PO), Take  by mouth., Disp: , Rfl:   •  doxazosin  "(CARDURA) 1 MG Tab, Take 1 mg by mouth every day., Disp: , Rfl:   •  atorvastatin (LIPITOR) 10 MG Tab, Take 20 mg by mouth every evening., Disp: , Rfl:   •  Famotidine (PEPCID PO), Take  by mouth., Disp: , Rfl:   •  pantoprazole (PROTONIX) 40 MG Tablet Delayed Response, Take 1 Tab by mouth every day., Disp: 30 Tab, Rfl: 0  •  ranitidine (ZANTAC) 150 MG Tab, Take 1 Tab by mouth 2 times a day., Disp: 60 Tab, Rfl: 0      Allergies:  Asa [aspirin]    ROS  Review of Systems   Constitutional: Positive for  malaise/fatigue. Negative for fever and diaphoresis.   HENT: Positive for congestion, ear pain and sore throat. Negative for ear discharge, hearing loss, nosebleeds and tinnitus.    Eyes: Negative for blurred vision, double vision and photophobia.   Respiratory: Positive for cough, sputum production, shortness of breath and wheezing. Negative for hemoptysis.    Cardiovascular: Negative for chest pain and palpitations.   Gastrointestinal: Negative for nausea, vomiting, abdominal pain, diarrhea and constipation.   Genitourinary: Negative for dysuria and flank pain.   Musculoskeletal: Negative for joint pain and myalgias.   Skin: Negative for itching and rash.   Neurological:  Negative for dizziness, tingling and weakness.   Endo/Heme/Allergies: Does not bruise/bleed easily.   Psychiatric/Behavioral: Negative for depression. The patient is not nervous/anxious.           Objective:     /72   Pulse 64   Temp 36.1 °C (97 °F)   Resp 16   Ht 1.6 m (5' 2.99\")   Wt 75.8 kg (167 lb)   SpO2 94%   BMI 29.59 kg/m²      Physical Exam      Physical Exam   Constitutional: PT is oriented to person, place, and time. PT appears well-developed and well-nourished. No distress.   HENT:   Head: Normocephalic and atraumatic.   Right Ear: Hearing, tympanic membrane, external ear and ear canal normal.   Left Ear: Hearing, tympanic membrane, external ear and ear canal normal.   Nose: Mucosal edema, rhinorrhea and sinus tenderness " present. Right sinus exhibits frontal sinus tenderness. Left sinus exhibits frontal sinus tenderness.   Mouth/Throat: Uvula is midline. Mucous membranes are pale. Posterior oropharyngeal edema and posterior oropharyngeal erythema present. No oropharyngeal exudate.   Eyes: Conjunctivae normal and EOM are normal. Pupils are equal, round, and reactive to light. Right eye exhibits no discharge. Left eye exhibits no discharge.   Neck: Normal range of motion. Neck supple. No thyromegaly present.   Cardiovascular: Normal rate, regular rhythm, normal heart sounds and intact distal pulses.  Exam reveals no gallop and no friction rub.    No murmur heard.  Pulmonary/Chest: Effort normal. No respiratory distress. PT has wheezes. PT has no rales. PT exhibits tenderness.   Abdominal: Soft. Bowel sounds are normal. PT exhibits no distension and no mass. There is no tenderness. There is no rebound and no guarding.   Musculoskeletal: Normal range of motion. PT exhibits no edema and no tenderness.   Lymphadenopathy:     PT has no cervical adenopathy.   Neurological: Pt is alert and oriented to person, place, and time. Pt has normal reflexes. No cranial nerve deficit.   Skin: Skin is warm and dry. No rash noted. No erythema.   Psychiatric: PT has a normal mood and affect. Pt behavior is normal. Judgment and thought content normal.          Assessment/Plan:     1. Bronchitis    - doxycycline (VIBRAMYCIN) 100 MG Tab; Take 1 Tab by mouth 2 times a day for 7 days.  Dispense: 14 Tab; Refill: 0  - MethylPREDNISolone (MEDROL DOSEPAK) 4 MG Tablet Therapy Pack; Use as directed  Dispense: 21 Tab; Refill: 0    2. URI with cough and congestion    - promethazine-codeine (PHENERGAN-CODEINE) 6.25-10 MG/5ML Syrup; Take 5 mL by mouth 4 times a day as needed for up to 14 days.  Dispense: 240 mL; Refill: 0  - MethylPREDNISolone (MEDROL DOSEPAK) 4 MG Tablet Therapy Pack; Use as directed  Dispense: 21 Tab; Refill: 0    Rest, fluids encouraged.  OTC  decongestant for congestion/cough  AVS with medical info given.  Pt was in full understanding and agreement with the plan.  Follow-up as needed if symptoms worsen or fail to improve.

## 2021-01-14 DIAGNOSIS — Z23 NEED FOR VACCINATION: ICD-10-CM
